# Patient Record
Sex: MALE | Race: WHITE | NOT HISPANIC OR LATINO | URBAN - METROPOLITAN AREA
[De-identification: names, ages, dates, MRNs, and addresses within clinical notes are randomized per-mention and may not be internally consistent; named-entity substitution may affect disease eponyms.]

---

## 2020-01-01 ENCOUNTER — HOSPITAL ENCOUNTER (OUTPATIENT)
Dept: RADIOLOGY | Facility: HOSPITAL | Age: 50
Discharge: HOME | End: 2020-08-26
Attending: NURSE PRACTITIONER
Payer: MEDICARE

## 2020-01-01 ENCOUNTER — TELEPHONE (OUTPATIENT)
Dept: TRANSPLANT | Facility: CLINIC | Age: 50
End: 2020-01-01

## 2020-01-01 ENCOUNTER — OFFICE VISIT (OUTPATIENT)
Dept: TRANSPLANT | Facility: CLINIC | Age: 50
End: 2020-01-01
Payer: MEDICARE

## 2020-01-01 VITALS
SYSTOLIC BLOOD PRESSURE: 142 MMHG | HEART RATE: 55 BPM | RESPIRATION RATE: 18 BRPM | WEIGHT: 236.75 LBS | HEIGHT: 70 IN | OXYGEN SATURATION: 98 % | DIASTOLIC BLOOD PRESSURE: 71 MMHG | BODY MASS INDEX: 33.89 KG/M2 | TEMPERATURE: 99 F

## 2020-01-01 DIAGNOSIS — N18.6 CONTROLLED TYPE 2 DIABETES MELLITUS WITH CHRONIC KIDNEY DISEASE ON CHRONIC DIALYSIS, UNSPECIFIED WHETHER LONG TERM INSULIN USE: ICD-10-CM

## 2020-01-01 DIAGNOSIS — N18.6 ESRD ON HEMODIALYSIS: ICD-10-CM

## 2020-01-01 DIAGNOSIS — Z89.512 STATUS POST BELOW-KNEE AMPUTATION OF LEFT LOWER EXTREMITY: ICD-10-CM

## 2020-01-01 DIAGNOSIS — Z76.82 ORGAN TRANSPLANT CANDIDATE: ICD-10-CM

## 2020-01-01 DIAGNOSIS — E03.9 HYPOTHYROIDISM, UNSPECIFIED TYPE: ICD-10-CM

## 2020-01-01 DIAGNOSIS — I73.9 PAD (PERIPHERAL ARTERY DISEASE): ICD-10-CM

## 2020-01-01 DIAGNOSIS — F41.9 CHRONIC ANXIETY: ICD-10-CM

## 2020-01-01 DIAGNOSIS — I15.0 RENOVASCULAR HYPERTENSION: ICD-10-CM

## 2020-01-01 DIAGNOSIS — Z99.2 CONTROLLED TYPE 2 DIABETES MELLITUS WITH CHRONIC KIDNEY DISEASE ON CHRONIC DIALYSIS, UNSPECIFIED WHETHER LONG TERM INSULIN USE: ICD-10-CM

## 2020-01-01 DIAGNOSIS — E11.22 CONTROLLED TYPE 2 DIABETES MELLITUS WITH CHRONIC KIDNEY DISEASE ON CHRONIC DIALYSIS, UNSPECIFIED WHETHER LONG TERM INSULIN USE: ICD-10-CM

## 2020-01-01 DIAGNOSIS — Z01.818 PRE-TRANSPLANT EVALUATION FOR CHRONIC KIDNEY DISEASE: Primary | ICD-10-CM

## 2020-01-01 DIAGNOSIS — Z76.82 ORGAN TRANSPLANT CANDIDATE: Primary | ICD-10-CM

## 2020-01-01 DIAGNOSIS — I25.10 CORONARY ARTERY DISEASE INVOLVING NATIVE CORONARY ARTERY OF NATIVE HEART WITHOUT ANGINA PECTORIS: ICD-10-CM

## 2020-01-01 DIAGNOSIS — Z99.2 ESRD ON HEMODIALYSIS: ICD-10-CM

## 2020-01-01 PROCEDURE — 76770 US EXAM ABDO BACK WALL COMP: CPT | Mod: 26,TXP,, | Performed by: RADIOLOGY

## 2020-01-01 PROCEDURE — 71046 X-RAY EXAM CHEST 2 VIEWS: CPT | Mod: TC,TXP

## 2020-01-01 PROCEDURE — 99214 OFFICE O/P EST MOD 30 MIN: CPT | Mod: PBBFAC,25,TXP | Performed by: NURSE PRACTITIONER

## 2020-01-01 PROCEDURE — 72170 X-RAY EXAM OF PELVIS: CPT | Mod: 26,TXP,, | Performed by: RADIOLOGY

## 2020-01-01 PROCEDURE — 72170 X-RAY EXAM OF PELVIS: CPT | Mod: TC,TXP

## 2020-01-01 PROCEDURE — 93978 VASCULAR STUDY: CPT | Mod: 26,TXP,, | Performed by: RADIOLOGY

## 2020-01-01 PROCEDURE — 93978 VASCULAR STUDY: CPT | Mod: TC,TXP

## 2020-01-01 PROCEDURE — 99205 OFFICE O/P NEW HI 60 MIN: CPT | Mod: S$PBB,TXP,, | Performed by: NURSE PRACTITIONER

## 2020-01-01 PROCEDURE — 72170 XR PELVIS ROUTINE AP: ICD-10-PCS | Mod: 26,TXP,, | Performed by: RADIOLOGY

## 2020-01-01 PROCEDURE — 99205 PR OFFICE/OUTPT VISIT, NEW, LEVL V, 60-74 MIN: ICD-10-PCS | Mod: S$PBB,TXP,, | Performed by: NURSE PRACTITIONER

## 2020-01-01 PROCEDURE — 99999 PR PBB SHADOW E&M-EST. PATIENT-LVL IV: ICD-10-PCS | Mod: PBBFAC,TXP,, | Performed by: NURSE PRACTITIONER

## 2020-01-01 PROCEDURE — 93978 US DOPP ILIACS BILATERAL: ICD-10-PCS | Mod: 26,TXP,, | Performed by: RADIOLOGY

## 2020-01-01 PROCEDURE — 99203 OFFICE O/P NEW LOW 30 MIN: CPT | Mod: S$PBB,TXP,, | Performed by: TRANSPLANT SURGERY

## 2020-01-01 PROCEDURE — 76770 US RETROPERITONEAL COMPLETE: ICD-10-PCS | Mod: 26,TXP,, | Performed by: RADIOLOGY

## 2020-01-01 PROCEDURE — 99999 PR PBB SHADOW E&M-EST. PATIENT-LVL IV: CPT | Mod: PBBFAC,TXP,, | Performed by: NURSE PRACTITIONER

## 2020-01-01 PROCEDURE — 76770 US EXAM ABDO BACK WALL COMP: CPT | Mod: TC,TXP

## 2020-01-01 PROCEDURE — 71046 X-RAY EXAM CHEST 2 VIEWS: CPT | Mod: 26,TXP,, | Performed by: RADIOLOGY

## 2020-01-01 PROCEDURE — 99203 PR OFFICE/OUTPT VISIT, NEW, LEVL III, 30-44 MIN: ICD-10-PCS | Mod: S$PBB,TXP,, | Performed by: TRANSPLANT SURGERY

## 2020-01-01 PROCEDURE — 71046 XR CHEST PA AND LATERAL: ICD-10-PCS | Mod: 26,TXP,, | Performed by: RADIOLOGY

## 2020-01-01 RX ORDER — CARVEDILOL 25 MG/1
25 TABLET ORAL DAILY
COMMUNITY

## 2020-01-01 RX ORDER — ZOLPIDEM TARTRATE 10 MG/1
10 TABLET ORAL NIGHTLY PRN
COMMUNITY

## 2020-01-01 RX ORDER — OMEPRAZOLE 40 MG/1
40 CAPSULE, DELAYED RELEASE ORAL DAILY
COMMUNITY

## 2020-01-01 RX ORDER — CITALOPRAM 20 MG/1
20 TABLET, FILM COATED ORAL DAILY
COMMUNITY

## 2020-01-01 RX ORDER — PRASUGREL 10 MG/1
10 TABLET, FILM COATED ORAL DAILY
COMMUNITY

## 2020-01-01 RX ORDER — SUCROFERRIC OXYHYDROXIDE 500 MG/1
500 TABLET, CHEWABLE ORAL 3 TIMES DAILY
COMMUNITY

## 2020-01-01 RX ORDER — LEVOTHYROXINE SODIUM 75 UG/1
75 TABLET ORAL
COMMUNITY

## 2020-01-01 RX ORDER — OXYCODONE AND ACETAMINOPHEN 10; 325 MG/1; MG/1
1 TABLET ORAL EVERY 4 HOURS PRN
COMMUNITY
Start: 2020-01-01

## 2020-08-24 NOTE — PROGRESS NOTES
Spoke to patient to complete history for upcoming appointment his daughter will come with him to his appointment he is aware that he have to bring a small breakfast to eat after blood is drawn. He will not need a

## 2020-08-26 PROBLEM — Z89.512 STATUS POST BELOW-KNEE AMPUTATION OF LEFT LOWER EXTREMITY: Status: ACTIVE | Noted: 2020-01-01

## 2020-08-26 PROBLEM — I73.9 PAD (PERIPHERAL ARTERY DISEASE): Status: ACTIVE | Noted: 2020-01-01

## 2020-08-26 PROBLEM — N18.6 CONTROLLED TYPE 2 DIABETES MELLITUS WITH CHRONIC KIDNEY DISEASE ON CHRONIC DIALYSIS: Status: ACTIVE | Noted: 2020-01-01

## 2020-08-26 PROBLEM — E11.22 CONTROLLED TYPE 2 DIABETES MELLITUS WITH CHRONIC KIDNEY DISEASE ON CHRONIC DIALYSIS: Status: ACTIVE | Noted: 2020-01-01

## 2020-08-26 PROBLEM — N18.6 ESRD ON HEMODIALYSIS: Status: ACTIVE | Noted: 2020-01-01

## 2020-08-26 PROBLEM — Z99.2 ESRD ON HEMODIALYSIS: Status: ACTIVE | Noted: 2020-01-01

## 2020-08-26 PROBLEM — I15.0 RENOVASCULAR HYPERTENSION: Status: ACTIVE | Noted: 2020-01-01

## 2020-08-26 PROBLEM — F41.9 CHRONIC ANXIETY: Status: ACTIVE | Noted: 2020-01-01

## 2020-08-26 PROBLEM — I25.10 CORONARY ARTERY DISEASE INVOLVING NATIVE CORONARY ARTERY OF NATIVE HEART WITHOUT ANGINA PECTORIS: Status: ACTIVE | Noted: 2020-01-01

## 2020-08-26 PROBLEM — Z01.818 PRE-TRANSPLANT EVALUATION FOR CHRONIC KIDNEY DISEASE: Status: ACTIVE | Noted: 2020-01-01

## 2020-08-26 PROBLEM — Z99.2 CONTROLLED TYPE 2 DIABETES MELLITUS WITH CHRONIC KIDNEY DISEASE ON CHRONIC DIALYSIS: Status: ACTIVE | Noted: 2020-01-01

## 2020-08-26 NOTE — TELEPHONE ENCOUNTER
Reviewed pt transplant labs.  Notified dialysis unit dietitian of the following abnormal labs via fax and requested their most recent nutrition note on this pt.  Once this note is received it will be scanned into pt's chart.    Ha1c 8.2  Chol 241  Glu 222  Phos 5.6

## 2020-08-26 NOTE — PROGRESS NOTES
PHARM.D. PRE-TRANSPLANT NOTE:    This patient's medication therapy was evaluated as part of his pre-transplant evaluation.      The following general pharmacologic concerns were noted: prasugrel for CAD, needs to be held perioperatively    The following concerns for post-operative pain management were noted: Percocet for chronic pain     The following pharmacologic concerns related to HCV therapy were noted: none      This patient's medication profile was reviewed for considerations for DAA Hepatitis C therapy:    [x]  No current inducers of CYP 3A4 or PGP  [x]  No amiodarone on this patient's EMR profile in the last 24 months  [x]  No past or current atrial fibrillation on this patient's EMR profile       Current Outpatient Medications   Medication Sig Dispense Refill    carvediloL (COREG) 25 MG tablet Take 25 mg by mouth once daily.      citalopram (CELEXA) 20 MG tablet Take 20 mg by mouth once daily.      levothyroxine (SYNTHROID) 75 MCG tablet Take 75 mcg by mouth before breakfast.      multivitamin with minerals tablet Take 1 tablet by mouth once daily.      omeprazole (PRILOSEC) 40 MG capsule Take 40 mg by mouth once daily.      oxyCODONE-acetaminophen (PERCOCET)  mg per tablet Take 1 tablet by mouth every 4 (four) hours as needed.      patiromer calcium sorbitex (VELTASSA) 8.4 gram PwPk Take 8.4 g by mouth daily as needed.      prasugreL (EFFIENT) 10 mg Tab Take 10 mg by mouth once daily.      sucroferric oxyhydroxide (VELPHORO) 500 mg Chew Take 500 mg by mouth 3 (three) times daily.      zolpidem (AMBIEN) 10 mg Tab Take 10 mg by mouth nightly as needed.       No current facility-administered medications for this visit.          Currently he is responsible for preparing / administering this patient's medications on a daily basis.  I am available for consultation and can be contacted, as needed by the other members of the Kidney Transplant team.

## 2020-08-26 NOTE — LETTER
August 28, 2020        Adarsh Shaw  [address] [place] [address] [address]  [place] [address]  Phone: [phone]  Fax: [phone]             Steve [facility]- Transplant 1st Fl  [address]  [facility] [address]  Phone: [phone]   Patient: Warner Butler   MR Number: [id number]   YOB: 1970   Date of Visit: 8/26/2020       Dear Dr. Adarsh Shaw    Thank you for referring Warner Butler to me for evaluation. Attached you will find relevant portions of my assessment and plan of care.    If you have questions, please do not hesitate to call me. I look forward to following Warner Butler along with you.    Sincerely,    Meghan Guzman NP    Enclosure    If you would like to receive this communication electronically, please contact [email or web address] or [phone] to request [employer] Link access.    [employer] Link is a tool which provides read-only access to select patient information with whom you have a relationship. Its easy to use and provides real time access to review your patients record including encounter summaries, notes, results, and demographic information.    If you feel you have received this communication in error or would no longer like to receive these types of communications, please e-mail [email or web address]

## 2020-08-26 NOTE — PROGRESS NOTES
Transplant Surgery  Kidney Transplant Recipient Evaluation    Referring Physician: Adarsh Shaw  Current Nephrologist: Adarsh Shaw    Subjective:     Reason for Visit: evaluate transplant candidacy    History of Present Illness: Warner Butler is a 49 y.o. year old male undergoing transplant evaluation.    Dialysis History: Warner is on hemodialysis.      Transplant History: N/A    Etiology of Renal Disease: Diabetes Mellitus - Type II (based on medical records from referral).    Review of Systems    Objective:     Physical Exam:  Constitutional:   Vitals reviewed: yes   Well-nourished and well-groomed: yes  Eyes:   Sclerae icteric: no   Extraocular movements intact: yes  GI:    Bowel sounds normal: yes   Tenderness: no    If yes, quadrant/location: not applicable   Palpable masses: no    If yes, quadrant/location: not applicable   Hepatosplenomegaly: no   Ascites: no   Hernia: no    If yes, type/location: not applicable   Surgical scars: no    If yes, type/location: not applicable  Resp:   Effort normal: yes   Breath sounds normal: yes    CV:   Regular rate and rhythm: yes   Heart sounds normal: yes   Femoral pulses normal: yes   Extremities edematous: no  Skin:   Rashes or lesions present: no    If yes, describe:not applicable   Jaundice:: no    Musculoskeletal:   Gait normal: yes   Strength normal: yes   Left AK amputation  Psych:   Oriented to person, place, and time: yes   Affect and mood normal: yes    Additional comments: not applicable    Counseling: We provided Warner Butler with a group education session today.  We discussed kidney transplantation at length with him, including risks, potential complications, and alternatives in the management of his renal failure.  The discussion included complications related to anesthesia, bleeding, infection, primary nonfunction, and ATN.  I discussed the typical postoperative course, length of hospitalization, the need for long-term immunosuppression, and the need for  long-term routine follow-up.  I discussed living-donor and -donor transplantation and the relative advantages and disadvantages of each.  I also discussed average waiting times for both living donation and  donation.  I discussed national and center-specific survival rates.  I also mentioned the potential benefit of multicenter listing to candidates listed with centers within more than one organ procurement organization.  All questions were answered.    Final determination of transplant candidacy will be made once evaluation is complete and reviewed by the Kidney & Kidney/Pancreas Selection Committee.         Transplant Surgery - Candidacy   Assessment/Plan:   Warner Butler has end stage renal disease (ESRD) on dialysis. I see no surgical contraindication to placing a kidney transplant. Based on available information, Warner Butler is a suitable kidney transplant candidate.     Needs iliac doppler ultrasound.     Jayden Fox MD

## 2020-08-26 NOTE — PROGRESS NOTES
INITIAL PATIENT EDUCATION NOTE    Mr. Warner Butler was seen in pre-kidney transplant clinic for evaluation for kidney, kidney/pancreas or pancreas only transplant.  The patient attended a an individual video education session that discussed/reviewed the following aspects of transplantation: evaluation and selection committee process, UNOS waitlist management/multiple listings, types of organs offered (KDPI < 85%, KDPI > 85%, PHS increased risk, DCD, HCV+, HIV+ for HIV+ recipients and enbloc/dual), financial aspects, surgical procedures, dietary instruction pre- and post-transplant, health maintenance pre- and post-transplant, post-transplant hospitalization and outpatient follow-up, potential to participate in a research protocol, and medication management and side effects.  A question and answer session was provided after the presentation.    The patient was seen by all members of the multi-disciplinary team to include: Nephrologist/PA, Surgeon, , Transplant Coordinator, , Pharmacist and Dietician (if applicable).    The patient reviewed and signed all consents for evaluation which were witnessed and sent to scanning into the [facility] chart.    The patient was given an education book and plan for further evaluation based on his individual assessment.      The patient was encouraged to call with any questions or concerns.

## 2020-08-26 NOTE — PROGRESS NOTES
Transplant Nephrology  Kidney Transplant Recipient Evaluation    Referring Physician: Adarsh Shaw  Current Nephrologist: Adarsh Shaw    Subjective:   CC:  Initial evaluation of kidney transplant candidacy.    HPI:  Mr. Butler is a 49 y.o. year old White male who has presented to be evaluated as a potential kidney transplant recipient.  He has ESRD secondary to diabetic nephropathy.  Patient is currently on hemodialysis started on 1/25/2019. Patient is dialyzing on MWF schedule.  Patient reports that he is tolerating dialysis well.. Dialyzes for 4 1/2 per session . BP stable.  He has a RUE AV fistula for dialysis access.     Previous Transplant: no    DM2   Diagnosed in his 30s   Takes Lantus 1-2 x / week, otherwise his BS will bottom out  HX PAD, charcot foot, s/p Left BKA  Has prosthesis   HX gastroparesis --nauseated every AM but is OK with taking meds    Lab Results   Component Value Date    HGBA1C 8.2 (H) 08/26/2020         NSTEMI/CAD  S/p coronary stents-(-states he has had stents a few times. He remembers  Getting them in ~ 2003 and 2017) and CABG x 4 (2016)  Anticoagulation: Effient   Cardiologist --Dr Darren Fuller in [place]    Denies stroke     Hypothyroid  On levothyroxine   No results found for: TSH      fx assessment   Left BKA 2015.    wears  prostheses  Has chronic pain to the lower back and hips. He follows with pain mgmt  Has had steroid  injections in past , now  Takes ~ 3 Percocet's daily    Despite chronic pain issues, he Is active and will work in his shop and cuts the grass.       Donor-no    Kidney  bx -no         Past Medical History:   Diagnosis Date    Allergy     Anemia     Anxiety     Charcot foot due to diabetes mellitus     Congenital heart disease     Coronary artery disease     Depression     Diabetes mellitus     Diabetes mellitus, type 2     Disorder of kidney and ureter     Encounter for blood transfusion     GERD (gastroesophageal reflux disease)     Gout      Hyperlipidemia     Hypertension     MRSA (methicillin resistant staph aureus) culture positive     MRSA carrier     NSTEMI (non-ST elevated myocardial infarction)     Obesity     KATY (obstructive sleep apnea)     Osteomyelitis     left ankle    PAD (peripheral artery disease)     Thyroid disease        Past Medical and Surgical History: Mr. Butler  has a past medical history of Allergy, Anemia, Anxiety, Charcot foot due to diabetes mellitus, Congenital heart disease, Coronary artery disease, Depression, Diabetes mellitus, Diabetes mellitus, type 2, Disorder of kidney and ureter, Encounter for blood transfusion, GERD (gastroesophageal reflux disease), Gout, Hyperlipidemia, Hypertension, MRSA (methicillin resistant staph aureus) culture positive, MRSA carrier, NSTEMI (non-ST elevated myocardial infarction), Obesity, KATY (obstructive sleep apnea), Osteomyelitis, PAD (peripheral artery disease), and Thyroid disease.  He has a past surgical history that includes Cardiac surgery; Arterial bypass surgry; Cholecystectomy; AV fistula placement; Coronary artery bypass graft; Below knee amputation of lower extremity (Left); Tonsillectomy; Coronary angioplasty with stent; Hip surgery; Femur fracture surgery; and Pressure ulcer debridement.    Past Social and Family History: Mr. Butler reports that he has never smoked. He has never used smokeless tobacco. He reports previous alcohol use. He reports that he does not use drugs. His family history includes Cancer in his mother; Diabetes in his mother; Heart disease in his mother; Hypertension in his father and mother.    Review of Systems   Constitutional: Positive for fatigue and unexpected weight change. Negative for activity change, appetite change, chills and fever.        Weight loss   HENT: Negative for congestion, facial swelling, postnasal drip, rhinorrhea, sinus pressure, sore throat and trouble swallowing.         Sinus congestion    Eyes: Positive for visual  "disturbance. Negative for pain and redness.   Respiratory: Negative for cough, chest tightness, shortness of breath and wheezing.    Cardiovascular: Negative.  Negative for chest pain, palpitations and leg swelling.   Gastrointestinal: Positive for nausea. Negative for abdominal pain, diarrhea and vomiting.        Indigestion   Gastroparesis    Genitourinary: Positive for decreased urine volume. Negative for dysuria, flank pain and urgency.        Makes a very small amount of urine in the AM   Musculoskeletal: Positive for arthralgias and gait problem. Negative for neck pain and neck stiffness.        Back and hips    Skin: Negative for rash.   Allergic/Immunologic: Negative for environmental allergies, food allergies and immunocompromised state.   Neurological: Negative for dizziness, weakness, light-headedness and headaches.   Hematological: Bruises/bleeds easily.        Effient    Psychiatric/Behavioral: Positive for sleep disturbance. Negative for agitation and confusion. The patient is not nervous/anxious.         Hx chronic anxiety/depression --no longer on meds        Objective:   Blood pressure (!) 142/71, pulse (!) 55, temperature 99 °F (37.2 °C), temperature source Oral, resp. rate 18, height 5' 10.47" (1.79 m), weight 107.4 kg (236 lb 12.4 oz), SpO2 98 %.body mass index is 33.52 kg/m².    Physical Exam  Vitals signs reviewed.   Constitutional:       Appearance: Normal appearance. He is well-developed.   HENT:      Head: Normocephalic.      Mouth/Throat:      Pharynx: No oropharyngeal exudate.   Eyes:      General: No scleral icterus.     Pupils: Pupils are equal, round, and reactive to light.   Neck:      Musculoskeletal: Normal range of motion and neck supple.   Cardiovascular:      Rate and Rhythm: Normal rate and regular rhythm.      Heart sounds: Normal heart sounds.   Pulmonary:      Effort: Pulmonary effort is normal.      Breath sounds: Normal breath sounds.   Abdominal:      General: Bowel sounds " are normal. There is no distension.      Palpations: Abdomen is soft. There is no mass.      Tenderness: There is no abdominal tenderness.   Musculoskeletal: Normal range of motion.        Arms:         Legs:    Skin:     General: Skin is warm and dry.      Comments: Bilateral forearm tattoos    Neurological:      Mental Status: He is alert and oriented to person, place, and time.      Motor: No abnormal muscle tone.      Coordination: Coordination normal.   Psychiatric:         Behavior: Behavior normal.         Labs:  Lab Results   Component Value Date    WBC 7.09 08/26/2020    HGB 10.8 (L) 08/26/2020    HCT 35.1 (L) 08/26/2020     (L) 08/26/2020    K 4.4 08/26/2020    CL 97 08/26/2020    CO2 28 08/26/2020    BUN 21 (H) 08/26/2020    CREATININE 4.1 (H) 08/26/2020    EGFRNONAA 15.8 (A) 08/26/2020    CALCIUM 9.0 08/26/2020    PHOS 5.6 (H) 08/26/2020    ALBUMIN 3.7 08/26/2020    AST 11 08/26/2020    ALT 13 08/26/2020    .0 (H) 08/26/2020       No results found for: PREALBUMIN, BILIRUBINUA, GGT, AMYLASE, LIPASE, PROTEINUA, NITRITE, RBCUA, WBCUA    No results found for: HLAABCTYPE    Labs were reviewed with the patient.    Assessment:     1. Pre-transplant evaluation for chronic kidney disease    2. ESRD on hemodialysis    3. Controlled type 2 diabetes mellitus with chronic kidney disease on chronic dialysis, unspecified whether long term insulin use    4. Coronary artery disease involving native coronary artery of native heart without angina pectoris    5. Renovascular hypertension    6. Status post below-knee amputation of left lower extremity    7. PAD (peripheral artery disease)    8. Chronic anxiety    9. BMI 33.0-33.9,adult    10. Hypothyroidism, unspecified type        Plan:   HX NSTEMI, CAD, s/p coronary artery stents and CABG. Chronic anticoagulation, on Effient   Cardiology clearance --> Dr Darren Fuller in [place], [place]   Hypothyroid--add thyroid panel   PAD, s/p Left BKA--iliac doppler  studies and PXR       Transplant Candidacy:   Based on available information, Mr. Butler is a high-risk kidney transplant candidate.   Meets center eligibility for accepting HCV+ donor offer - yes.  Patient educated on HCV+ donors. Warner is willing to accept HCV+ donor offer - yes   Patient is a candidate for KDPI > 85 kidney donor offer - no d/t weight and anticoagulation.  Final determination of transplant candidacy will be made once workup is complete and reviewed by the selection committee.    Meghan Guzman NP       OS Patient Status  Functional Status: 60% - Requires occasional assistance but is able to care for needs  Physical Capacity: No Limitations

## 2020-08-26 NOTE — PROGRESS NOTES
"Transplant Recipient Adult Psychosocial Assessment    Warner Butler  [address] [place] [address]  Telephone Information:   Mobile [phone]   Home  [phone] (home)  Work  There is no work phone number on file.  E-mail  [email or web address]    Sex: male  YOB: 1970  Age: 49 y.o.    Encounter Date: 2020  [place] Citizen: yes  Primary Language: English   Needed: no    Emergency Contact:  Name: Georgette Butler  Relationship: wife  Address: same as patient  Phone Numbers:  n/a (home), n/a (work), [phone] (mobile)    Family/Social Support:   Number of dependents/: 2-18 y/o dtr and 12 y/o son  Marital history:  once to current wife of 33 years; 4 children  Other family dynamics: Mother , father unknown whether living, no siblings.  Pt stated his father was "out of the picture" because he began to drink heavily after he returned from [place] and they never had a relationship. Pt's eldest son, Uri was recently asked to move from patient's home due to drug and ETOH abuse as well as theft from parents, multiple rehabs, Pk, age 28 lives in an apartment over the family home, 18 y/o dtr and 12 y/o son live in home.      Household Composition:  Name: Cory Butler  Age: both 49  Relationship: patient and wife  Does person drive? Patient's wife drives, but patient does not drive.    Name: Shiela Butler [phone]  Age: 19  Relationship: daughter  Does person drive? yes    Name: Stephanie Butler  Age: 13  Relationship: son  Does person drive? no    Do you and your caregivers have access to reliable transportation? yes  PRIMARY CAREGIVER: Shiela Butler dtr will be primary caregiver, phone number [phone].      provided in-depth information to patient and caregiver regarding pre- and post-transplant caregiver role.   strongly encourages patient and caregiver to have concrete plan regarding post-transplant " care giving, including back-up caregiver(s) to ensure care giving needs are met as needed.    Patient and Caregiver states understanding all aspects of caregiver role/commitment and is able/willing/committed to being caregiver to the fullest extent necessary.    Patient and Caregiver verbalizes understanding of the education provided today and caregiver responsibilities.         remains available. Patient and Caregiver agree to contact  in a timely manner if concerns arise.      Able to take time off work without financial concerns: Summer is not working.  Pt's wife is working full time and needs to remain at work for as much time as possible due to limited time off..     Additional Significant Others who will Assist with Transplant:  Name: Pk Butler [phone]  Age: 26  City: [place] State: [place]  Relationship: son  Does person drive? yes      Living Will: no  Healthcare Power of : no  Advance Directives on file: <<no information> per medical record.  Verbally reviewed LW/HCPA information.   provided patient with copy of LW/HCPA documents and provided education on completion of forms.    Living Donors: No. Education and resource information given to patient.    Highest Education Level: High School (9-12) or GED  Reading Ability: 12th grade  Reports difficulty with: seeing and memory  Learns Best By:  Verbal instruction     Status: no  VA Benefits: no     Working for Income: No  If no, reason not working: Disability  Patient is disabled.  Prior to disability, patient  was employed as a heavy equipment rep..    Spouse/Significant Other Employment: Works at [employer] in the [employer]    Disabled: yes: date disability began: 2011, due to: heart disease and back injury, L BKA.    Monthly Income:  Other: $combined 3800.00  Able to afford all costs now and if transplanted, including medications: yes  Patient and Caregiver verbalizes  understanding of personal responsibilities related to transplant costs and the importance of having a financial plan to ensure that patients transplant costs are fully covered.       provided fundraising information/education. Patient and Caregiververbalizes understanding.   remains available.    Insurance:   Payor/Plan Subscr  Sex Relation Sub. Ins. ID Effective Group Num   1. MEDICARE - ME* TORIBIO ROUSSEAU 1970 Male  [id number]                                    [address]   2. BLUE CROSS BL* TORIBIO BAKER 1970 Male  [id number]                                   [address] [address]     Primary Insurance (for UNOS reporting): Public Insurance - Medicare FFS (Fee For Service)  Secondary Insurance (for UNOS reporting): Private Insurance Through wife's employer  Patient and Caregiver verbalizes clear understanding that patient may experience difficulty obtaining and/or be denied insurance coverage post-surgery. This includes and is not limited to disability insurance, life insurance, health insurance, burial insurance, long term care insurance, and other insurances.      Patient and Caregiver also reports understanding that future health concerns related to or unrelated to transplantation may not be covered by patient's insurance.  Resources and information provided and reviewed.     Patient and Caregiver provides verbal permission to release any necessary information to outside resources for patient care and discharge planning.  Resources and information provided are reviewed.      Dialysis Adherence: Patient reports on dialysis two years. Pt stated he has never missed a treatment or shortened his time.  SW has sent a dialysis compliance check letter to unit.     Infusion Service: patient utilizing? no  Home Health: patient utilizing? no  DME: prosthetic L leg, w/c, canes, walker, raised toilet seat, grabber  Pulmonary/Cardiac Rehab: pt denies   ADLS:  Pt  "states ability to independently accomplish most adls.  Requires assistance in some areas.  Does not drive.      Adherence:   Pt states current and expected compliance with all healthcare recommendations..  Adherence education and counseling provided.     Per History Section:  Past Medical History:   Diagnosis Date    Allergy     Anemia     Anxiety     Charcot foot due to diabetes mellitus     Congenital heart disease     Coronary artery disease     Depression     Diabetes mellitus     Diabetes mellitus, type 2     Disorder of kidney and ureter     Encounter for blood transfusion     GERD (gastroesophageal reflux disease)     Gout     Hyperlipidemia     Hypertension     MRSA (methicillin resistant staph aureus) culture positive     MRSA carrier     NSTEMI (non-ST elevated myocardial infarction)     Obesity     KATY (obstructive sleep apnea)     Osteomyelitis     left ankle    PAD (peripheral artery disease)     Thyroid disease      Social History     Tobacco Use    Smoking status: Never Smoker    Smokeless tobacco: Never Used   Substance Use Topics    Alcohol use: Not Currently     Social History     Substance and Sexual Activity   Drug Use Never     Social History     Substance and Sexual Activity   Sexual Activity Not Currently       Per Today's Psychosocial:  Tobacco: none, patient denies any use.  Alcohol: none, patient denies any use.  Illicit Drugs/Non-prescribed Medications: none, patient denies any use.    Patient and Caregiver states clear understanding of the potential impact of substance use as it relates to transplant candidacy and is aware of possible random substance screening.  Substance abstinence/cessation counseling, education and resources provided and reviewed.     Arrests/DWI/Treatment/Rehab: patient denies    Psychiatric History:    Mental Health: depression, anxiety and pt stated he feels these are due to "life" and a normal reaction to adverse " events.  Psychiatrist/Counselor: Pt stated he was assessed by a psychiatrist for social security disability eligibility  Medications:  Celexa and Ambien prescribed by PCP  Suicide/Homicide Issues: Pt denies current or past suicidal/homicidal ideation.   Safety at home: Pt denies any home safety concerns.      Knowledge: Patient and Caregiver states having clear understanding and realistic expectations regarding the potential risks and potential benefits of organ transplantation and organ donation and agrees to discuss with health care team members and support system members, as well as to utilize available resources and express questions and/or concerns in order to further facilitate the pt informed decision-making.  Resources and information provided and reviewed.    Patient and Caregiver is aware of [facility]'s affiliation and/or partnership with agencies in home health care, LTAC, SNF, [facility], and other hospitals and clinics.    Understanding: Patient and Caregiver reports having a clear understanding of the many lifetime commitments involved with being a transplant recipient, including costs, compliance, medications, lab work, procedures, appointments, concrete and financial planning, preparedness, timely and appropriate communication of concerns, abstinence (ETOH, tobacco, illicit non-prescribed drugs), adherence to all health care team recommendations, support system and caregiver involvement, appropriate and timely resource utilization and follow-through, mental health counseling as needed/recommended, and patient and caregiver responsibilities.  Social Service Handbook, resources and detailed educational information provided and reviewed.  Educational information provided.    Patient and Caregiver also reports current and expected compliance with health care regime and states having a clear understanding of the importance of compliance.      Patient and Caregiver reports a clear understanding that risks and  benefits may be involved with organ transplantation and with organ donation.       Patient and Caregiver also reports clear understanding that psychosocial risk factors may affect patient, and include but are not limited to feelings of depression, generalized anxiety, anxiety regarding dependence on others, post traumatic stress disorder, feelings of guilt and other emotional and/or mental concerns, and/or exacerbation of existing mental health concerns.  Detailed resources provided and discussed.      Patient and Caregiver agrees to access appropriate resources in a timely manner as needed and/or as recommended, and to communicate concerns appropriately.  Patient and Caregiver also reports a clear understanding of treatment options available.     Patient and Caregiver received education in a group setting.   reviewed education, provided additional information, and answered questions.    Feelings or Concerns: Pt stated his only concern is the potential for rejection.    Coping: Identify Patient & Caregiver Strategies to [place]:   1. Currently & Pre-transplant - Car racing and building car engines, spending quiet time, watching TV, time with friends and family   2. At the time of surgery - family support, friends   3. During post-Transplant & Recovery Period - family support, tv    Goals: Wants to return to previously enjoyable activities such as fishing with son.  Patient referred to Vocational Rehabilitation.    Interview Behavior: Patient and Caregiver presents as alert and oriented x 4, pleasant, good eye contact, well groomed, recall good, concentration/judgement good, average intelligence, calm, communicative, cooperative and asking and answering questions appropriately. Pt was accompanied by his 18 y/o dtr who presented as attentive and supportive of pt's pursuit of transplant.         Transplant Social Work - Candidacy  Assessment/Plan:     Psychosocial Suitability: Based on psychosocial risk  factors, patient presents as low risk, due to lack of significant risk factors.  Pt has support, caregiver plan, insurance, transportation.    Recommendations/Additional Comments: Recommend fundraising, will likely benefit from [facility] post transplant.    Brianna Henderson LCSW

## 2020-09-04 NOTE — TELEPHONE ENCOUNTER
Mr Butler notified of need for CT Scan and is agreeable.    ----- Message from Miguel Acosta Jr., MD sent at 9/4/2020  9:35 AM CDT -----  Iliac calcifications and stent are present    Need noncon CT pelvis

## 2020-09-04 NOTE — LETTER
September 4, 2020    Warner Butlre  [address]  [place] [address]             Dear Dr. Adarsh Shaw, Primary Doctor No    Patient: Warner Butler   MR Number: [id number]   YOB: 1970     A battery of tests must be done to determine if you are in suitable health to undergo a kidney transplant.  All  the recommended studies must be completed and received by the transplant team before you can be presented to the transplant selection committee. Once all your evaluation is complete the committee will then decide if you are a suitable transplant candidate.  The following studies need to be obtained at home:    __x_ Thyroid Panel:     _x__CT Scan of Abd/Pelvis to assess vascular calcifications.     __x_Colonoscopy: This is a recommended screening tests for all adults over the age of 50 or above,  Americans age 45 and above, or those considered at risk for colon cancer.  ICD-10 code Z12.11.    __x_Cardiac stress test: ICD-1 code N19. We request you to have a stress test to determine if you have any evidence of blockages in your heart.  We usually recommend a nuclear stress test or dobutamine stress echo, given most patients cannot walk on a treadmill long enough to achieve their target heart rate.     _x__2-D echo with color flow doppler: ICD-10 code N19. We need to look at the heart valves and heart muscle, and determine pulmonary artery pressures.      __x_Cardiology consult: We are asking that your cardiologist clear you for transplant surgery and maximize your medical management.  We also need to note if there are special  management strategies that need to be used during your transplant event, especially since we routinely use IV fluids to help the new kidney function at its best.  Also, your heart doctor needs to know that the average wait for a kidney transplant can be as long as 3-5 years.  Thus, we not only ask for a preoperative clearance, but also optimal management of your heart  (for  example: lipids, high blood pressure, heart failure, etc.).    You and your doctor should feel free to contact us at any time, if there are questions or concerns about these tests or the transplant evaluation process.    Sincerely,    Kallie Jeffrey MD  Medical Director, Kidney & Kidney/Pancreas Transplantation      [facility]  [address] [place] [id number]  [phone]    Cc:

## 2020-10-01 NOTE — TELEPHONE ENCOUNTER
"Compliance check:  Dialysis unit reports in the past three months pt has had "0" no shows, "2 AMAs, 7/6/20, 7/29/30, Pt request" AMAs, labs  "Pt's phosphorus is high", transportation "Pt rely [sic] on friends and family for transportation" and family support no concerns noted.    Reported by DU via fax back sheet    "

## 2020-10-28 NOTE — TELEPHONE ENCOUNTER
"Pt reports called to inform us that he had his Colonoscopy done 10/22/20 and STRESS test & ECHO done 10/27/20. Pt reports he has not received any records from either test, but does promise to send records in "as soon as they are received". Pt also reports he has his CT of abd/pelvis scheduled. States, "I'm just waiting for the day so I can get that done. I haven't called yall and given an update because I thought it was pointless. What's the reason to call if I don't have any records to send yet. I just don't want my chart closed." Patient instructed his chart will be updated with our conversation from today, and that it is very important for him to fax in his records whenever they become available. Patient reports understanding. Denies other questions or concerns at this time.     ----- Message from Noemi Jaeger RN sent at 10/28/2020  8:54 AM CDT -----  Regarding: FW: Pt Info    ----- Message -----  From: Anthony Wallace  Sent: 10/28/2020   8:48 AM CDT  To: [facility] Pre-Kidney Transplant Clinical  Subject: Pt Info                                                  Pt calling to speak with coord regarding test he has completed                 [phone] (U)        "

## 2020-12-02 NOTE — TELEPHONE ENCOUNTER
"I received requested records on Mr Butler however there was no stress test, no echo and no cardiac clearance. I am unable to request CT abd/pelvis as I do not know where he had this done. I attempted to call him however there was no answer by himself and at his wifes number. I called the dialysis unit and they stated they gave him my message today to call me and per the nurse he stated "well I have done everything they asked me to, I will call her tomorrow. I will await his call.  "

## 2021-04-08 ENCOUNTER — EPISODE CHANGES (OUTPATIENT)
Dept: TRANSPLANT | Facility: CLINIC | Age: 51
End: 2021-04-08

## 2021-04-15 ENCOUNTER — POST MORTEM DOCUMENTATION (OUTPATIENT)
Dept: TRANSPLANT | Facility: CLINIC | Age: 51
End: 2021-04-15

## 2021-05-20 NOTE — TELEPHONE ENCOUNTER
Call zion and Warner let me know he has had his heart testing and his colonoscopy but has not gotten results yet. I reminded him to have his doctors send us results.    ----- Message from Noemi Jaeger RN sent at 10/28/2020  8:54 AM CDT -----  Regarding: FW: Pt Info    ----- Message -----  From: Anthony Wallace  Sent: 10/28/2020   8:48 AM CDT  To: [facility] Pre-Kidney Transplant Clinical  Subject: Pt Info                                                  Pt calling to speak with coord regarding test he has completed                 [phone] (M)        
Is This A New Presentation, Or A Follow-Up?: Actinic Keratosis
Additional History: Pt is wanting to discuss PDT treatments.